# Patient Record
Sex: FEMALE | Race: WHITE | NOT HISPANIC OR LATINO | Employment: FULL TIME | URBAN - METROPOLITAN AREA
[De-identification: names, ages, dates, MRNs, and addresses within clinical notes are randomized per-mention and may not be internally consistent; named-entity substitution may affect disease eponyms.]

---

## 2023-02-18 ENCOUNTER — HOSPITAL ENCOUNTER (EMERGENCY)
Facility: CLINIC | Age: 28
Discharge: HOME | End: 2023-02-19
Attending: EMERGENCY MEDICINE | Admitting: EMERGENCY MEDICINE
Payer: COMMERCIAL

## 2023-02-18 DIAGNOSIS — R11.2 NAUSEA AND VOMITING, UNSPECIFIED VOMITING TYPE: ICD-10-CM

## 2023-02-18 DIAGNOSIS — G44.219 EPISODIC TENSION-TYPE HEADACHE, NOT INTRACTABLE: ICD-10-CM

## 2023-02-18 LAB
ALBUMIN SERPL BCG-MCNC: 4.6 G/DL (ref 3.5–5.2)
ALP SERPL-CCNC: 144 U/L (ref 40–129)
ALT SERPL W P-5'-P-CCNC: 139 U/L (ref 10–50)
ANION GAP SERPL CALCULATED.3IONS-SCNC: 17 MMOL/L (ref 7–15)
AST SERPL W P-5'-P-CCNC: 85 U/L (ref 10–50)
BASOPHILS # BLD AUTO: 0.1 10E3/UL (ref 0–0.2)
BASOPHILS NFR BLD AUTO: 1 %
BILIRUB SERPL-MCNC: 0.6 MG/DL
BUN SERPL-MCNC: 12.2 MG/DL (ref 6–20)
CALCIUM SERPL-MCNC: 10 MG/DL (ref 8.6–10)
CHLORIDE SERPL-SCNC: 101 MMOL/L (ref 98–107)
CREAT SERPL-MCNC: 0.72 MG/DL (ref 0.67–1.17)
CRP SERPL-MCNC: 10.7 MG/L
DEPRECATED HCO3 PLAS-SCNC: 23 MMOL/L (ref 22–29)
EOSINOPHIL # BLD AUTO: 0.1 10E3/UL (ref 0–0.7)
EOSINOPHIL NFR BLD AUTO: 1 %
ERYTHROCYTE [DISTWIDTH] IN BLOOD BY AUTOMATED COUNT: 12 % (ref 10–15)
ERYTHROCYTE [SEDIMENTATION RATE] IN BLOOD BY WESTERGREN METHOD: 21 MM/HR (ref 0–15)
GFR SERPL CREATININE-BSD FRML MDRD: >90 ML/MIN/1.73M2
GLUCOSE SERPL-MCNC: 116 MG/DL (ref 70–99)
HCG SERPL QL: NEGATIVE
HCT VFR BLD AUTO: 42.5 % (ref 40–53)
HGB BLD-MCNC: 14.2 G/DL (ref 13.3–17.7)
HOLD SPECIMEN: NORMAL
IMM GRANULOCYTES # BLD: 0.1 10E3/UL
IMM GRANULOCYTES NFR BLD: 1 %
INR PPP: 1.06 (ref 0.85–1.15)
LACTATE SERPL-SCNC: 1 MMOL/L (ref 0.7–2)
LIPASE SERPL-CCNC: 19 U/L (ref 13–60)
LYMPHOCYTES # BLD AUTO: 2.3 10E3/UL (ref 0.8–5.3)
LYMPHOCYTES NFR BLD AUTO: 20 %
MCH RBC QN AUTO: 30 PG (ref 26.5–33)
MCHC RBC AUTO-ENTMCNC: 33.4 G/DL (ref 31.5–36.5)
MCV RBC AUTO: 90 FL (ref 78–100)
MONOCYTES # BLD AUTO: 0.9 10E3/UL (ref 0–1.3)
MONOCYTES NFR BLD AUTO: 8 %
NEUTROPHILS # BLD AUTO: 8.1 10E3/UL (ref 1.6–8.3)
NEUTROPHILS NFR BLD AUTO: 69 %
NRBC # BLD AUTO: 0 10E3/UL
NRBC BLD AUTO-RTO: 0 /100
PLATELET # BLD AUTO: 313 10E3/UL (ref 150–450)
POTASSIUM SERPL-SCNC: 3.7 MMOL/L (ref 3.4–5.3)
PROT SERPL-MCNC: 8.3 G/DL (ref 6.4–8.3)
RBC # BLD AUTO: 4.74 10E6/UL (ref 4.4–5.9)
SODIUM SERPL-SCNC: 141 MMOL/L (ref 136–145)
T4 FREE SERPL-MCNC: 1.11 NG/DL (ref 0.9–1.7)
TSH SERPL DL<=0.005 MIU/L-ACNC: 4.72 UIU/ML (ref 0.3–4.2)
WBC # BLD AUTO: 11.6 10E3/UL (ref 4–11)

## 2023-02-18 PROCEDURE — 81001 URINALYSIS AUTO W/SCOPE: CPT | Performed by: EMERGENCY MEDICINE

## 2023-02-18 PROCEDURE — 86140 C-REACTIVE PROTEIN: CPT | Performed by: EMERGENCY MEDICINE

## 2023-02-18 PROCEDURE — 83690 ASSAY OF LIPASE: CPT | Performed by: EMERGENCY MEDICINE

## 2023-02-18 PROCEDURE — 85025 COMPLETE CBC W/AUTO DIFF WBC: CPT | Performed by: EMERGENCY MEDICINE

## 2023-02-18 PROCEDURE — 84703 CHORIONIC GONADOTROPIN ASSAY: CPT | Performed by: EMERGENCY MEDICINE

## 2023-02-18 PROCEDURE — 84439 ASSAY OF FREE THYROXINE: CPT | Performed by: EMERGENCY MEDICINE

## 2023-02-18 PROCEDURE — 80053 COMPREHEN METABOLIC PANEL: CPT | Performed by: EMERGENCY MEDICINE

## 2023-02-18 PROCEDURE — 96375 TX/PRO/DX INJ NEW DRUG ADDON: CPT | Performed by: EMERGENCY MEDICINE

## 2023-02-18 PROCEDURE — 93005 ELECTROCARDIOGRAM TRACING: CPT | Performed by: EMERGENCY MEDICINE

## 2023-02-18 PROCEDURE — 99285 EMERGENCY DEPT VISIT HI MDM: CPT | Mod: 25 | Performed by: EMERGENCY MEDICINE

## 2023-02-18 PROCEDURE — 85652 RBC SED RATE AUTOMATED: CPT | Performed by: EMERGENCY MEDICINE

## 2023-02-18 PROCEDURE — 85610 PROTHROMBIN TIME: CPT | Performed by: EMERGENCY MEDICINE

## 2023-02-18 PROCEDURE — 83605 ASSAY OF LACTIC ACID: CPT | Performed by: EMERGENCY MEDICINE

## 2023-02-18 PROCEDURE — 96374 THER/PROPH/DIAG INJ IV PUSH: CPT | Performed by: EMERGENCY MEDICINE

## 2023-02-18 PROCEDURE — 84443 ASSAY THYROID STIM HORMONE: CPT | Performed by: EMERGENCY MEDICINE

## 2023-02-18 PROCEDURE — 36415 COLL VENOUS BLD VENIPUNCTURE: CPT | Performed by: EMERGENCY MEDICINE

## 2023-02-18 PROCEDURE — 96361 HYDRATE IV INFUSION ADD-ON: CPT | Performed by: EMERGENCY MEDICINE

## 2023-02-18 PROCEDURE — 93010 ELECTROCARDIOGRAM REPORT: CPT | Performed by: EMERGENCY MEDICINE

## 2023-02-18 PROCEDURE — 250N000011 HC RX IP 250 OP 636: Performed by: EMERGENCY MEDICINE

## 2023-02-18 PROCEDURE — 99284 EMERGENCY DEPT VISIT MOD MDM: CPT | Mod: 25 | Performed by: EMERGENCY MEDICINE

## 2023-02-18 RX ORDER — LORAZEPAM 2 MG/ML
0.5 INJECTION INTRAMUSCULAR ONCE
Status: COMPLETED | OUTPATIENT
Start: 2023-02-18 | End: 2023-02-18

## 2023-02-18 RX ADMIN — LORAZEPAM 0.5 MG: 2 INJECTION INTRAMUSCULAR; INTRAVENOUS at 23:21

## 2023-02-18 ASSESSMENT — ACTIVITIES OF DAILY LIVING (ADL): ADLS_ACUITY_SCORE: 35

## 2023-02-19 ENCOUNTER — APPOINTMENT (OUTPATIENT)
Dept: CT IMAGING | Facility: CLINIC | Age: 28
End: 2023-02-19
Attending: EMERGENCY MEDICINE
Payer: COMMERCIAL

## 2023-02-19 VITALS
HEART RATE: 115 BPM | RESPIRATION RATE: 16 BRPM | DIASTOLIC BLOOD PRESSURE: 69 MMHG | SYSTOLIC BLOOD PRESSURE: 125 MMHG | TEMPERATURE: 97.4 F | OXYGEN SATURATION: 95 %

## 2023-02-19 LAB
ALBUMIN UR-MCNC: 30 MG/DL
APPEARANCE UR: CLEAR
BILIRUB UR QL STRIP: NEGATIVE
C COLI+JEJUNI+LARI FUSA STL QL NAA+PROBE: NOT DETECTED
C DIFF TOX B STL QL: NEGATIVE
COLOR UR AUTO: YELLOW
EC STX1 GENE STL QL NAA+PROBE: NOT DETECTED
EC STX2 GENE STL QL NAA+PROBE: NOT DETECTED
GLUCOSE UR STRIP-MCNC: NEGATIVE MG/DL
HGB UR QL STRIP: NEGATIVE
KETONES UR STRIP-MCNC: 80 MG/DL
LEUKOCYTE ESTERASE UR QL STRIP: NEGATIVE
MUCOUS THREADS #/AREA URNS LPF: PRESENT /LPF
NITRATE UR QL: NEGATIVE
NOROV GI+II ORF1-ORF2 JNC STL QL NAA+PR: NOT DETECTED
PH UR STRIP: 5.5 [PH] (ref 5–7)
RBC URINE: <1 /HPF
RVA NSP5 STL QL NAA+PROBE: NOT DETECTED
SALMONELLA SP RPOD STL QL NAA+PROBE: NOT DETECTED
SHIGELLA SP+EIEC IPAH STL QL NAA+PROBE: NOT DETECTED
SP GR UR STRIP: 1.04 (ref 1–1.03)
SQUAMOUS EPITHELIAL: 2 /HPF
UROBILINOGEN UR STRIP-MCNC: 2 MG/DL
V CHOL+PARA RFBL+TRKH+TNAA STL QL NAA+PR: NOT DETECTED
WBC URINE: 2 /HPF
Y ENTERO RECN STL QL NAA+PROBE: NOT DETECTED

## 2023-02-19 PROCEDURE — 74177 CT ABD & PELVIS W/CONTRAST: CPT

## 2023-02-19 PROCEDURE — 70498 CT ANGIOGRAPHY NECK: CPT | Mod: 26 | Performed by: RADIOLOGY

## 2023-02-19 PROCEDURE — 70450 CT HEAD/BRAIN W/O DYE: CPT

## 2023-02-19 PROCEDURE — 70496 CT ANGIOGRAPHY HEAD: CPT | Mod: XS

## 2023-02-19 PROCEDURE — 70496 CT ANGIOGRAPHY HEAD: CPT | Mod: 26 | Performed by: RADIOLOGY

## 2023-02-19 PROCEDURE — 258N000003 HC RX IP 258 OP 636: Performed by: EMERGENCY MEDICINE

## 2023-02-19 PROCEDURE — 70498 CT ANGIOGRAPHY NECK: CPT | Mod: XS

## 2023-02-19 PROCEDURE — 74177 CT ABD & PELVIS W/CONTRAST: CPT | Mod: 26 | Performed by: RADIOLOGY

## 2023-02-19 PROCEDURE — 70498 CT ANGIOGRAPHY NECK: CPT

## 2023-02-19 PROCEDURE — 250N000011 HC RX IP 250 OP 636: Performed by: EMERGENCY MEDICINE

## 2023-02-19 PROCEDURE — 70496 CT ANGIOGRAPHY HEAD: CPT

## 2023-02-19 PROCEDURE — 87506 IADNA-DNA/RNA PROBE TQ 6-11: CPT | Performed by: EMERGENCY MEDICINE

## 2023-02-19 PROCEDURE — 87493 C DIFF AMPLIFIED PROBE: CPT | Performed by: EMERGENCY MEDICINE

## 2023-02-19 RX ORDER — DIPHENHYDRAMINE HYDROCHLORIDE 50 MG/ML
25 INJECTION INTRAMUSCULAR; INTRAVENOUS ONCE
Status: COMPLETED | OUTPATIENT
Start: 2023-02-19 | End: 2023-02-19

## 2023-02-19 RX ORDER — IOPAMIDOL 755 MG/ML
125 INJECTION, SOLUTION INTRAVASCULAR ONCE
Status: COMPLETED | OUTPATIENT
Start: 2023-02-19 | End: 2023-02-19

## 2023-02-19 RX ADMIN — DIPHENHYDRAMINE HYDROCHLORIDE 25 MG: 50 INJECTION, SOLUTION INTRAMUSCULAR; INTRAVENOUS at 00:36

## 2023-02-19 RX ADMIN — IOPAMIDOL 125 ML: 755 INJECTION, SOLUTION INTRAVENOUS at 02:09

## 2023-02-19 RX ADMIN — SODIUM CHLORIDE 1000 ML: 9 INJECTION, SOLUTION INTRAVENOUS at 00:29

## 2023-02-19 ASSESSMENT — ACTIVITIES OF DAILY LIVING (ADL)
ADLS_ACUITY_SCORE: 35
ADLS_ACUITY_SCORE: 35

## 2023-02-19 NOTE — ED PROVIDER NOTES
"ED Provider Note  [facility]      History     Chief Complaint   Patient presents with     Nausea & Vomiting     HPI  Fabi Whittaker is a 28 year old presenting today w/ multiple symptoms (headache, abdominal pain, nausea, vomiting, diarrhea, fevers) x multiple days.     Patient presents today in concern for multiple symptoms. First noted some headache, malaise and feeling unwell probably at least 3 days ago. Has had a headache like this previously, but hasn't had this severe for about a year, and says doesn't typically get migraine level of headaches. No traumas or falls. No neck pain or stiffness. No photophobia or other eye/ear symptoms. Had been diagnosed w/ sinusitis a couple weeks ago, improved though not completely resolved. No sore throats or other HEENT symptoms. No CP or breathing symptoms. Patient does report having chronic tachycardia (90's to 110's baseline). Does have abdominal pain w/ nausea and nonbloody emesis, as well as diarrhea (also nonbloody). Has abdominal pain, diffuse, worse in the low abdomen and on the left. No  sx's or changes. Some mild radiation of abdominal pain to left low back. No midline or right sided sx's. No other MSK or extremity sx's.  No rashes or skin changes. No numbness, tingling or weakness. No other new symptoms or complaints at this time.  Full ROS completed w/o any additional findings.     Past Medical History  PMH: Not immunocompromised, has had similar HAs in the past, reported recent sinusitis, chronic tachycardia.  No past surgical history on file.  No current outpatient medications on file.    Allergies   Allergen Reactions     Duloxetine Other (See Comments)     Patient states she gets \"joltinh\"     Family History  No family history on file.  Social History       Past medical history, past surgical history, medications, allergies, family history, and social history were reviewed with the patient. No additional pertinent items.  "     A complete review of systems was performed with pertinent positives and negatives noted in the HPI, and all other systems negative.    Physical Exam   BP: 118/81  Pulse: 111  Temp: 97.4  F (36.3  C)  Resp: 16  SpO2: 98 %  Physical Exam  CONSTITUTIONAL: Well-developed and well-nourished. Awake and alert. Non-toxic appearance. No acute distress.   HENT:   - Head: Normocephalic and atraumatic.   - Ears: External ear grossly normal.   - Nose: Nose normal. No rhinorrhea. No epistaxis. No discharge, no findings for mucor.   - Mouth/Throat: MMM. No trismus or drooling.  No tongue elevation. Normal voice. No asymmetry. No apparent edema. No obvious findings for PTA, RPA, epiglottitis or Rodríguez's Angina.   EYES: Conjunctivae and lids are normal. No scleral icterus. No photophobia. PERRL. EOMI w/o abnormal eye movements or abnormalities.   NECK: Normal range of motion w/o any discomfort. No stiffness, rigidity or findings for meningitis/encephalitis. phonation normal. Neck supple.  No tracheal deviation, no stridor. No edema or erythema noted. No adenopathy.   CARDIOVASCULAR: Normal rate, regular rhythm and no appreciable abnormal heart sounds.  PULMONARY/CHEST: Normal work of breathing. No accessory muscle usage or stridor. No respiratory distress.  No appreciable abnormal breath sounds.  ABDOMEN: Soft, non-distended. Diffuse tenderness, but worse on the left and slightly in the epigastric region, but no peritoneal findings, no rigidity, rebound or guarding.  No palpable masses or abnormal pulsatility appreciated.  MUSCULOSKELETAL: Extremities warm and seemingly well perfused. No edema or calf tenderness.  NEUROLOGIC: Awake, alert. Not disoriented. No seizure activity. GCS 15. No focal findings/deficits appreciated.   SKIN: Skin is warm and dry. No rash noted. No diaphoresis. No pallor. No petechiae or purpura.    PSYCHIATRIC: Normal mood and affect. Speech and behavior normal. Thought processes linear. Cognition and  memory are normal. No SI/HI reported.      ED Course, Procedures, & Data          EKG Interpretation:      Interpreted by Ning Christian MD  Time reviewed: 22:59:35  Symptoms at time of EKG: nausea and vomiting   Rhythm: sinus tachycardia  Rate: 110 bpm  Axis: Normal  Ectopy: none  Conduction: normal  ST Segments/ T Waves: Nonspecific T wave abnormality  Comparison to prior: No old EKG available  Clinical Impression: Sinus tachycardia. Nonspecific T wave abnormality. Abnormal ECG.          Medical Decision Making  The patient's presentation is strongly suggestive of an acute illness with systemic symptoms and an undiagnosed new problem with uncertain diagnosis.    The patient's evaluation involved:  review of external note(s) from 1 sources (see separate area of note for details)  ordering and/or review of 3+ test(s) in this encounter (see separate area of note for details)  discussion of management or test interpretation with another health professional (see separate area of note for details)    The patient's management involved prescription drug management (including medications given in the ED) and further care after sign-out to overnight EM physician (see their note for further management).      Assessment & Plan    IMPRESSION:   28 year old male w/ PMH notable for multiple symptoms (headache, abdominal pain, nausea, vomiting, diarrhea, fevers) x multiple days.     Clinically, patient appears nontoxic, NAD. Otherwise on examination, no abnormal HEENT findings. Had tachycardia on arrival, but says has chronically elevated HRs at baseline and otherwise no apparent cardiopulmonary findings. Diffuse abdominal discomfort on exam, worse on the left and epigastric region and to some degree left flank/back, but no midline or right back findings. Neuro intact. No findings for meningitis or encephalitis.     Ddx includes, but not limited to, gastroenteritis or other infectious condition contributing to headache, sinusitis,  PNA, gastritis/PUD,  UTI/pyelonephritis, et al. No findings for mucor or emergent type sinusitis type findings. Think unlikely meningitis or encephalitis (already had symptoms for multiple days and is well appearing and so unlikely a bacterial meningits), no neck sx's, has had similar HA before, etc. Is also accompanied by all the abdominal/GI sx's and so could be driven by some sort of that process and HA is subsequent to such, etc.     PLAN:   - Labs, urine studies, stool studies  - Risks/benefits of pursuing imaging reviewed and accepted.     INTERVENTIONS:   - IVF  - IV ativan (QTC was a bit prolonged)  - IV Diphenhydramine    RE-EVALUATION:  - The patient's symptoms were somewhat improving  - Pt otherwise continues to do well here in the ED, no acute issues or apparent concerning changes in vitals or clinical appearance.    DISCUSSIONS:  - w/ Patient: I have reviewed the available findings at time of shift change. Discussed pending studies and tentative plan, made sure pt knew Obs admission for further eval management was option even if pending studies returned negative given unclear cause of sx's with the patient. Patient signed out to overnight EM physician     SIGNOUT:  - Patient signed out to overnight EM Physician.  - Impression at shift change: HA, abdominal pain, nausea, vomiting, diarrhea, recent sinusitis  - Pending at shift change: head/neck/abdominal imaging, urine studies  - Tentative plan: F/U pending studies, clinically reassess. Manage any positive findings accordingly. If negative can consider obs admission for further evaluation/management        ______________________________________________________________________            Ning Christian MD  [facility] EMERGENCY DEPARTMENT  2/18/2023     Ning Christian MD  02/20/23 0759

## 2023-02-19 NOTE — ED PROVIDER NOTES
Sign Out Provider: Dr. Christian    Sign Out Plan: 28-year-old here with nausea, vomiting, and headache.  Pending CT imaging at the time of signout.    Reassessment: I reviewed CT scan of the head which is unremarkable with no acute intracranial process, CTA of the head and neck with no significant stenosis/occlusion/dissection, CTV with no intracranial venous thrombosis or occlusion.  CT of the abdomen pelvis which is unremarkable with no acute infection or etiology of patient's pain.  On reevaluation patient continues to rest comfortably, no distress. Patient does still c/o of a headache however declined any further pain medication in the ED. Patient prefers to go home and rest. Plan for discharge home with continued supportive care.  Recommend oral hydration, Tylenol and ibuprofen as needed for pain.  Encourage close outpatient follow-up with her primary care provider and return precautions discussed.  Patient understands and agrees with plan.    Disposition: discharged       Blaire Yun MD  02/19/23 [id number]

## 2023-02-19 NOTE — ED TRIAGE NOTES
Patient ambulatory to triage for nausea and vomiting. Patient also endorses a headache and fever the past few days.

## 2023-02-19 NOTE — DISCHARGE INSTRUCTIONS
.Thank you for your patience today.  Please follow-up with your regular doctor or in one of our clinics in the next 2-3 days for further evaluation and follow-up care.  Please call to schedule an appointment.  [facility] [phone] or [facility] [phone]. Please continue your own medications.  Please rest, drink plenty of fluids.  Please take Tylenol 1000 mg and ibuprofen 600 mg every 6 hours as needed for pain.  Please start with a liquid/soft diet and advance as tolerated.  Please return to the ER if you develop any worsening of your current symptoms.  It was a pleasure taking care of you today.  We hope you feel better soon.

## 2024-01-04 LAB
ATRIAL RATE - MUSE: 110 BPM
DIASTOLIC BLOOD PRESSURE - MUSE: NORMAL MMHG
INTERPRETATION ECG - MUSE: NORMAL
P AXIS - MUSE: 39 DEGREES
PR INTERVAL - MUSE: 134 MS
QRS DURATION - MUSE: 80 MS
QT - MUSE: 352 MS
QTC - MUSE: 476 MS
R AXIS - MUSE: 45 DEGREES
SYSTOLIC BLOOD PRESSURE - MUSE: NORMAL MMHG
T AXIS - MUSE: 26 DEGREES
VENTRICULAR RATE- MUSE: 110 BPM

## 2024-09-22 ENCOUNTER — HEALTH MAINTENANCE LETTER (OUTPATIENT)
Age: 29
End: 2024-09-22